# Patient Record
Sex: MALE | Race: WHITE
[De-identification: names, ages, dates, MRNs, and addresses within clinical notes are randomized per-mention and may not be internally consistent; named-entity substitution may affect disease eponyms.]

---

## 2017-04-27 ENCOUNTER — HOSPITAL ENCOUNTER (OUTPATIENT)
Dept: HOSPITAL 61 - PCVCCLINIC | Age: 67
Discharge: HOME | End: 2017-04-27
Attending: INTERNAL MEDICINE
Payer: MEDICARE

## 2017-04-27 DIAGNOSIS — E78.5: ICD-10-CM

## 2017-04-27 DIAGNOSIS — I65.29: Primary | ICD-10-CM

## 2017-04-27 DIAGNOSIS — I73.9: ICD-10-CM

## 2017-04-27 DIAGNOSIS — R09.89: ICD-10-CM

## 2017-04-27 DIAGNOSIS — I44.7: ICD-10-CM

## 2017-04-27 DIAGNOSIS — E11.9: ICD-10-CM

## 2017-04-27 PROCEDURE — 93005 ELECTROCARDIOGRAM TRACING: CPT

## 2017-04-27 PROCEDURE — G0463 HOSPITAL OUTPT CLINIC VISIT: HCPCS

## 2017-04-27 PROCEDURE — 80061 LIPID PANEL: CPT

## 2017-05-15 ENCOUNTER — HOSPITAL ENCOUNTER (OUTPATIENT)
Dept: HOSPITAL 61 - PCVCIMAG | Age: 67
Discharge: HOME | End: 2017-05-15
Attending: INTERNAL MEDICINE
Payer: MEDICARE

## 2017-05-15 DIAGNOSIS — Z72.0: ICD-10-CM

## 2017-05-15 DIAGNOSIS — I73.9: ICD-10-CM

## 2017-05-15 DIAGNOSIS — E11.9: ICD-10-CM

## 2017-05-15 DIAGNOSIS — Z01.812: Primary | ICD-10-CM

## 2017-05-15 DIAGNOSIS — I65.23: ICD-10-CM

## 2017-05-15 DIAGNOSIS — R09.89: ICD-10-CM

## 2017-05-15 PROCEDURE — 93880 EXTRACRANIAL BILAT STUDY: CPT

## 2017-05-15 PROCEDURE — 93351 STRESS TTE COMPLETE: CPT

## 2017-05-15 PROCEDURE — 93325 DOPPLER ECHO COLOR FLOW MAPG: CPT

## 2017-05-15 PROCEDURE — 93005 ELECTROCARDIOGRAM TRACING: CPT

## 2017-05-16 ENCOUNTER — HOSPITAL ENCOUNTER (OUTPATIENT)
Dept: HOSPITAL 61 - PCVCINTER | Age: 67
Discharge: HOME | End: 2017-05-16
Attending: INTERNAL MEDICINE
Payer: MEDICARE

## 2017-05-16 DIAGNOSIS — G45.8: ICD-10-CM

## 2017-05-16 DIAGNOSIS — I73.9: ICD-10-CM

## 2017-05-16 DIAGNOSIS — I15.0: ICD-10-CM

## 2017-05-16 DIAGNOSIS — I65.29: ICD-10-CM

## 2017-05-16 DIAGNOSIS — I25.10: Primary | ICD-10-CM

## 2017-05-16 PROCEDURE — 36225 PLACE CATH SUBCLAVIAN ART: CPT

## 2017-05-16 PROCEDURE — C1894 INTRO/SHEATH, NON-LASER: HCPCS

## 2017-05-16 PROCEDURE — 75630 X-RAY AORTA LEG ARTERIES: CPT

## 2017-05-16 PROCEDURE — 93458 L HRT ARTERY/VENTRICLE ANGIO: CPT

## 2017-05-16 PROCEDURE — C1751 CATH, INF, PER/CENT/MIDLINE: HCPCS

## 2017-05-16 PROCEDURE — 36252 INS CATH REN ART 1ST BILAT: CPT

## 2017-05-16 PROCEDURE — 36223 PLACE CATH CAROTID/INOM ART: CPT

## 2017-05-16 PROCEDURE — 99153 MOD SED SAME PHYS/QHP EA: CPT

## 2017-05-16 PROCEDURE — 76937 US GUIDE VASCULAR ACCESS: CPT

## 2017-05-16 PROCEDURE — 75716 ARTERY X-RAYS ARMS/LEGS: CPT

## 2017-05-16 PROCEDURE — C1769 GUIDE WIRE: HCPCS

## 2017-05-16 PROCEDURE — 99152 MOD SED SAME PHYS/QHP 5/>YRS: CPT

## 2017-05-24 ENCOUNTER — HOSPITAL ENCOUNTER (OUTPATIENT)
Dept: HOSPITAL 35 - CATH | Age: 67
Setting detail: OBSERVATION
LOS: 1 days | Discharge: HOME | End: 2017-05-25
Attending: INTERNAL MEDICINE | Admitting: INTERNAL MEDICINE
Payer: COMMERCIAL

## 2017-05-24 VITALS — SYSTOLIC BLOOD PRESSURE: 110 MMHG | DIASTOLIC BLOOD PRESSURE: 68 MMHG

## 2017-05-24 VITALS — WEIGHT: 158.5 LBS | BODY MASS INDEX: 22.69 KG/M2 | HEIGHT: 70 IN

## 2017-05-24 VITALS — DIASTOLIC BLOOD PRESSURE: 75 MMHG | SYSTOLIC BLOOD PRESSURE: 126 MMHG

## 2017-05-24 VITALS — DIASTOLIC BLOOD PRESSURE: 74 MMHG | SYSTOLIC BLOOD PRESSURE: 117 MMHG

## 2017-05-24 VITALS — DIASTOLIC BLOOD PRESSURE: 73 MMHG | SYSTOLIC BLOOD PRESSURE: 133 MMHG

## 2017-05-24 DIAGNOSIS — R07.89: ICD-10-CM

## 2017-05-24 DIAGNOSIS — R09.89: ICD-10-CM

## 2017-05-24 DIAGNOSIS — E78.5: ICD-10-CM

## 2017-05-24 DIAGNOSIS — I44.7: ICD-10-CM

## 2017-05-24 DIAGNOSIS — I73.9: ICD-10-CM

## 2017-05-24 DIAGNOSIS — I65.29: Primary | ICD-10-CM

## 2017-05-24 DIAGNOSIS — E11.9: ICD-10-CM

## 2017-05-24 DIAGNOSIS — Z86.73: ICD-10-CM

## 2017-05-25 VITALS — DIASTOLIC BLOOD PRESSURE: 64 MMHG | SYSTOLIC BLOOD PRESSURE: 116 MMHG

## 2017-05-25 VITALS — DIASTOLIC BLOOD PRESSURE: 82 MMHG | SYSTOLIC BLOOD PRESSURE: 130 MMHG

## 2017-05-25 VITALS — SYSTOLIC BLOOD PRESSURE: 116 MMHG | DIASTOLIC BLOOD PRESSURE: 64 MMHG

## 2017-05-25 VITALS — DIASTOLIC BLOOD PRESSURE: 60 MMHG | SYSTOLIC BLOOD PRESSURE: 101 MMHG

## 2017-05-25 LAB
ANION GAP SERPL CALC-SCNC: 9 MMOL/L (ref 7–16)
BUN SERPL-MCNC: 12 MG/DL (ref 7–18)
CALCIUM SERPL-MCNC: 8.3 MG/DL (ref 8.5–10.1)
CHLORIDE SERPL-SCNC: 99 MMOL/L (ref 98–107)
CHOLEST SERPL-MCNC: 119 MG/DL (ref ?–200)
CO2 SERPL-SCNC: 28 MMOL/L (ref 21–32)
CREAT SERPL-MCNC: 0.8 MG/DL (ref 0.7–1.3)
ERYTHROCYTE [DISTWIDTH] IN BLOOD BY AUTOMATED COUNT: 12.6 % (ref 10.5–14.5)
GLUCOSE SERPL-MCNC: 161 MG/DL (ref 74–106)
HCT VFR BLD CALC: 36.3 % (ref 42–52)
HDLC SERPL-MCNC: 26 MG/DL (ref 40–?)
HGB BLD-MCNC: 12.8 GM/DL (ref 14–18)
LDLC SERPL-MCNC: 22 MG/DL (ref ?–100)
MCH RBC QN AUTO: 32.2 PG (ref 26–34)
MCHC RBC AUTO-ENTMCNC: 35.1 G/DL (ref 28–37)
MCV RBC: 91.8 FL (ref 80–100)
PLATELET # BLD: 340 THOU/UL (ref 150–400)
POTASSIUM SERPL-SCNC: 4.1 MMOL/L (ref 3.5–5.1)
RBC # BLD AUTO: 3.95 MIL/UL (ref 4.5–6)
SODIUM SERPL-SCNC: 136 MMOL/L (ref 136–145)
TC:HDL: 4.6 RATIO
TRIGL SERPL-MCNC: 355 MG/DL (ref ?–150)
TROPONIN I SERPL-MCNC: < 0.04 NG/ML
VLDLC SERPL CALC-MCNC: 71 MG/DL (ref ?–40)
WBC # BLD AUTO: 10.1 THOU/UL (ref 4–11)

## 2017-07-27 ENCOUNTER — HOSPITAL ENCOUNTER (OUTPATIENT)
Dept: HOSPITAL 61 - PCVCCLINIC | Age: 67
Discharge: HOME | End: 2017-07-27
Attending: INTERNAL MEDICINE
Payer: MEDICARE

## 2017-07-27 DIAGNOSIS — I45.0: ICD-10-CM

## 2017-07-27 DIAGNOSIS — F17.200: ICD-10-CM

## 2017-07-27 DIAGNOSIS — E78.00: ICD-10-CM

## 2017-07-27 DIAGNOSIS — Z95.5: ICD-10-CM

## 2017-07-27 DIAGNOSIS — Z79.84: ICD-10-CM

## 2017-07-27 DIAGNOSIS — I25.10: Primary | ICD-10-CM

## 2017-07-27 DIAGNOSIS — Z79.82: ICD-10-CM

## 2017-07-27 DIAGNOSIS — E11.9: ICD-10-CM

## 2017-07-27 DIAGNOSIS — Z86.73: ICD-10-CM

## 2017-07-27 DIAGNOSIS — I77.89: ICD-10-CM

## 2017-07-27 DIAGNOSIS — I73.9: ICD-10-CM

## 2017-07-27 PROCEDURE — 93005 ELECTROCARDIOGRAM TRACING: CPT

## 2017-07-27 PROCEDURE — 80061 LIPID PANEL: CPT

## 2017-07-27 PROCEDURE — G0463 HOSPITAL OUTPT CLINIC VISIT: HCPCS

## 2017-11-02 ENCOUNTER — HOSPITAL ENCOUNTER (OUTPATIENT)
Dept: HOSPITAL 61 - PCVCIMAG | Age: 67
Discharge: HOME | End: 2017-11-02
Attending: INTERNAL MEDICINE
Payer: MEDICARE

## 2017-11-02 DIAGNOSIS — E78.5: ICD-10-CM

## 2017-11-02 DIAGNOSIS — E11.9: ICD-10-CM

## 2017-11-02 DIAGNOSIS — I73.9: ICD-10-CM

## 2017-11-02 DIAGNOSIS — F17.200: ICD-10-CM

## 2017-11-02 DIAGNOSIS — Z95.5: ICD-10-CM

## 2017-11-02 DIAGNOSIS — I25.10: Primary | ICD-10-CM

## 2017-11-02 PROCEDURE — 93325 DOPPLER ECHO COLOR FLOW MAPG: CPT

## 2017-11-02 PROCEDURE — 93351 STRESS TTE COMPLETE: CPT

## 2017-11-02 NOTE — PCVCIMAG
--------------- APPROVED REPORT --------------





Exam:  Stress Echocardiogram

Indication: CAD, STENT, SMOKING,

Stress Nurse: Aimee Giles RN

Status: routine



Ht: 5 ft 10 in  

HR: 97 bpm      BP: 120/60 mmHg



Procedure

The patient underwent an Exercise Stress Test using the Hernando 

Protocol. Blood pressure, heart rate, and EKG were monitored.

An Echocardiogram was performed by technician in four stages in quad 

fashion.  At peak stress, four selected images were obtained and 

placed side by side with resting images for comparison.



Stress Test Details

Stress Test:  Exercise stress testing was performed using a Hernando 

protocol.

HR

Resting HR:            97 bpmMax Heart Rate (APMHR): 155 bpm 

Max HR Achieved:  169 bpmTarget HR (85% APMHR): 131 bpm

% of APMHR:         109

HR response to stress: Normal HR response to stress



BP

Resting BP:  120/60 mmHg

Max BP:       164/60 mmHg



ECG

Resting ECG:  RBBB

Stress ECG:     RBBB



Clinical

Reason for Termination: Maximal effort

Exercise duration: 7 min 54 sec

Highest Stage Achieved: Stage 3: 3.4 mph at 14% grade. 

Exercise capacity: 10.10 METs

Overall Exercise Capacity for Age: Normal



Pre-Stress Echo

The resting Echocardiogram showed  left ventricular contractility 

with an estimated Ejection Fraction of about 45-50%. 

Normal wall motion in all segments on baseline images. Mild decrease 

in ejection fraction.



Post-Stress Echo

The stress Echocardiogram showed  left ventricular contractility with 

an estimated Ejection Fraction of about 45-50%. 

Normal augmentation of wall motion in all segments on post stress 

images.



Clinical

No clinical or ECG evidence for ischemia.



Conclusion

Clinical Response:  Non-ischemic

Exercise Capacity:  Average

Stress ECG Response:  Non-ischemic

Stress Echo Images:  Non-ischemic



Other Information

Study Quality: Adequate

## 2018-07-06 ENCOUNTER — HOSPITAL ENCOUNTER (OUTPATIENT)
Dept: HOSPITAL 61 - PCVCCLINIC | Age: 68
Discharge: HOME | End: 2018-07-06
Attending: INTERNAL MEDICINE
Payer: MEDICARE

## 2018-07-06 DIAGNOSIS — R09.89: ICD-10-CM

## 2018-07-06 DIAGNOSIS — I25.10: Primary | ICD-10-CM

## 2018-07-06 DIAGNOSIS — I77.9: ICD-10-CM

## 2018-07-06 DIAGNOSIS — Z86.73: ICD-10-CM

## 2018-07-06 DIAGNOSIS — E11.9: ICD-10-CM

## 2018-07-06 DIAGNOSIS — E78.00: ICD-10-CM

## 2018-07-06 DIAGNOSIS — I73.9: ICD-10-CM

## 2018-07-06 PROCEDURE — 93005 ELECTROCARDIOGRAM TRACING: CPT

## 2018-07-06 PROCEDURE — 80061 LIPID PANEL: CPT

## 2019-11-06 ENCOUNTER — HOSPITAL ENCOUNTER (OUTPATIENT)
Dept: HOSPITAL 61 - PCVCIMAG | Age: 69
Discharge: HOME | End: 2019-11-06
Attending: INTERNAL MEDICINE
Payer: COMMERCIAL

## 2019-11-06 DIAGNOSIS — F17.210: ICD-10-CM

## 2019-11-06 DIAGNOSIS — E11.51: ICD-10-CM

## 2019-11-06 DIAGNOSIS — I65.23: Primary | ICD-10-CM

## 2019-11-06 DIAGNOSIS — E78.5: ICD-10-CM

## 2019-11-06 PROCEDURE — 93325 DOPPLER ECHO COLOR FLOW MAPG: CPT

## 2019-11-06 PROCEDURE — 93880 EXTRACRANIAL BILAT STUDY: CPT

## 2019-11-06 PROCEDURE — 93351 STRESS TTE COMPLETE: CPT

## 2019-11-06 NOTE — PCVCIMAG
--------------- APPROVED REPORT --------------





Study performed:  11/06/2019 11:41:59



Exam:  Stress Echocardiogram

Indication: CAD , PAD, TIA,



Ht: 5 ft 10 in  



Medical History

Medical History: Stroke/TIA, PAD, CAD

Cardiac Risk Factors: DM, Hyperlipidemia

Pretest Chest Pain Characteristics: No chest pain

Exercise History: Physically active



Procedure

The patient underwent an Exercise Stress Test using the Hernando 

Protocol. Blood pressure, heart rate, and EKG were monitored.

An Echocardiogram was performed by technician in four stages in quad 

fashion.  At peak stress, four selected images were obtained and 

placed side by side with resting images for comparison.



Stress Test Details

Stress Test:  Exercise stress testing was performed using a Hernando 

protocol.

HR

Resting HR:            80 bpmMax Heart Rate (APMHR): 153 bpm 

Max HR Achieved:  139 bpmTarget HR (85% APMHR): 130 bpm

% of APMHR:         90

Recovery HR:            92 bpm

HR response to stress: Normal HR response to stress



BP

Resting BP:  122/78 mmHg

Max BP:       156/80 mmHg

Recovery BP:       138/80 mmHg

BP response to stress: Normal blood pressure response to 

stress.

ECG

Resting ECG:  Sinus Rhythm

Stress ECG:     Sinus Rhythm

ST Change: Non-ischemic, Upsloping ST depression

Maximum ST Deviation: 0.60 mm

Arrhythmia:    OCC PVCs

Recovery ECG: Sinus Rhythm

Recovery ST Change: Non-ischemic

Recovery ST Deviation: 0.30 mm

Recovery Arrhythmia: PVCs



Clinical

Reason for Termination: Maximal effort

Stress Symptoms: Leg Fatigue

Exercise duration: 6 min 47 sec

Highest Stage Achieved: Stage 3: 3.4 mph at 14% grade. 

Exercise capacity: 9.3 METs

Overall Exercise Capacity for Age: Average

Scale: Active

Angina Score: None

No complications.



Stress ECG Conclusion

[place] Treadmill Score is 3.0 which is Moderate risk.



Pre-Stress Echo

The resting Echocardiogram showed normal left ventricular 

contractility with an estimated Ejection Fraction of about 55-60%. 

Normal wall motion in all segments on baseline images.



Post-Stress Echo

The stress Echocardiogram showed normal left ventricular 

contractility with an estimated Ejection Fraction of about 65-70%. 

Normal augmentation of wall motion in all segments on post stress 

images.



Clinical

No clinical or ECG evidence for ischemia.



Conclusion

Clinical Response:  Non-ischemic

Exercise Capacity:  Average

Stress ECG Response:  Non-ischemic

Stress Echo Images:  Non-ischemic

## 2019-11-06 NOTE — PCVCIMAG
EXAM: BILATERAL CAROTID DUPLEX



INDICATION: Carotid Occlusive Disease.



FINDINGS: 

Doppler Measurements (centimeters per second):



RIGHT:  Peak CCA-87, Peak ECA-242, Diastolic ICA-32, Peak ICA-194,

ICA/CCA Ratio-2.2.



LEFT:  Peak CCA-85, Peak ECA-138, Diastolic ICA-30, Peak ICA-105,

ICA/CCA Ratio-1.2.



RIGHT CAROTID: The carotid bulb has moderate plaque. The proximal

internal carotid artery shows 60-70% stenosis. The common carotid

artery shows no significant stenosis. The external carotid artery

shows 70% stenosis.



LEFT CAROTID:  The carotid bulb has moderate plaque. The proximal

internal carotid artery shows <40% stenosis. The common carotid artery

shows no significant stenosis. The external carotid artery shows 40%

stenosis.



Antegrade flow in both vertebral arteries.



IMPRESSION: 

60-70% stenosis of the right internal carotid artery with moderate

plaque.

<40% stenosis of the left internal carotid artery with moderate

plaque.



LOC:[place] [id number]

## 2020-05-20 ENCOUNTER — HOSPITAL ENCOUNTER (OUTPATIENT)
Dept: HOSPITAL 35 - SJCVC | Age: 70
End: 2020-05-20
Attending: INTERNAL MEDICINE
Payer: COMMERCIAL

## 2020-05-20 DIAGNOSIS — Z79.899: ICD-10-CM

## 2020-05-20 DIAGNOSIS — R94.31: Primary | ICD-10-CM

## 2020-05-20 DIAGNOSIS — I45.10: ICD-10-CM

## 2020-05-20 DIAGNOSIS — E78.00: ICD-10-CM

## 2020-05-20 DIAGNOSIS — Z95.5: ICD-10-CM

## 2020-05-20 DIAGNOSIS — I25.10: ICD-10-CM

## 2020-05-20 DIAGNOSIS — E11.9: ICD-10-CM

## 2020-05-20 DIAGNOSIS — E78.1: ICD-10-CM

## 2020-05-20 DIAGNOSIS — I73.9: ICD-10-CM

## 2020-05-20 DIAGNOSIS — F17.210: ICD-10-CM

## 2020-11-23 ENCOUNTER — HOSPITAL ENCOUNTER (OUTPATIENT)
Dept: HOSPITAL 35 - SJCVCIMAG | Age: 70
End: 2020-11-23
Attending: INTERNAL MEDICINE
Payer: COMMERCIAL

## 2020-11-23 DIAGNOSIS — Z79.899: ICD-10-CM

## 2020-11-23 DIAGNOSIS — I45.10: ICD-10-CM

## 2020-11-23 DIAGNOSIS — Z98.61: ICD-10-CM

## 2020-11-23 DIAGNOSIS — E11.9: ICD-10-CM

## 2020-11-23 DIAGNOSIS — I25.10: Primary | ICD-10-CM

## 2020-11-23 DIAGNOSIS — E78.5: ICD-10-CM

## 2020-11-23 DIAGNOSIS — Z79.82: ICD-10-CM
